# Patient Record
Sex: FEMALE | Race: WHITE | URBAN - METROPOLITAN AREA
[De-identification: names, ages, dates, MRNs, and addresses within clinical notes are randomized per-mention and may not be internally consistent; named-entity substitution may affect disease eponyms.]

---

## 2021-09-18 ENCOUNTER — EMERGENCY (EMERGENCY)
Facility: HOSPITAL | Age: 71
LOS: 0 days | Discharge: HOME | End: 2021-09-18
Attending: STUDENT IN AN ORGANIZED HEALTH CARE EDUCATION/TRAINING PROGRAM
Payer: MEDICARE

## 2021-09-18 VITALS
HEART RATE: 95 BPM | TEMPERATURE: 99 F | SYSTOLIC BLOOD PRESSURE: 130 MMHG | RESPIRATION RATE: 18 BRPM | OXYGEN SATURATION: 95 % | DIASTOLIC BLOOD PRESSURE: 81 MMHG

## 2021-09-18 VITALS
RESPIRATION RATE: 24 BRPM | OXYGEN SATURATION: 99 % | SYSTOLIC BLOOD PRESSURE: 120 MMHG | HEART RATE: 72 BPM | DIASTOLIC BLOOD PRESSURE: 63 MMHG | TEMPERATURE: 98 F | WEIGHT: 171.96 LBS | HEIGHT: 62 IN

## 2021-09-18 DIAGNOSIS — M25.561 PAIN IN RIGHT KNEE: ICD-10-CM

## 2021-09-18 DIAGNOSIS — N39.0 URINARY TRACT INFECTION, SITE NOT SPECIFIED: ICD-10-CM
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
## 2021-09-18 DIAGNOSIS — M25.562 PAIN IN LEFT KNEE: ICD-10-CM

## 2021-09-18 DIAGNOSIS — S00.83XA CONTUSION OF OTHER PART OF HEAD, INITIAL ENCOUNTER: ICD-10-CM

## 2021-09-18 DIAGNOSIS — M25.551 PAIN IN RIGHT HIP: ICD-10-CM
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
## 2021-09-18 LAB
ALBUMIN SERPL ELPH-MCNC: 3.6 G/DL — SIGNIFICANT CHANGE UP (ref 3.3–5)
ALP SERPL-CCNC: 75 U/L — SIGNIFICANT CHANGE UP (ref 40–120)
ALT FLD-CCNC: 26 U/L — SIGNIFICANT CHANGE UP (ref 12–78)
ANION GAP SERPL CALC-SCNC: 5 MMOL/L — SIGNIFICANT CHANGE UP (ref 5–17)
APPEARANCE UR: ABNORMAL
APTT BLD: 33.5 SEC — SIGNIFICANT CHANGE UP (ref 27.5–35.5)
AST SERPL-CCNC: 31 U/L — SIGNIFICANT CHANGE UP (ref 15–37)
BASOPHILS # BLD AUTO: 0.03 K/UL — SIGNIFICANT CHANGE UP (ref 0–0.2)
BASOPHILS NFR BLD AUTO: 0.4 % — SIGNIFICANT CHANGE UP (ref 0–2)
BILIRUB SERPL-MCNC: 0.4 MG/DL — SIGNIFICANT CHANGE UP (ref 0.2–1.2)
BILIRUB UR-MCNC: NEGATIVE — SIGNIFICANT CHANGE UP
BUN SERPL-MCNC: 11 MG/DL — SIGNIFICANT CHANGE UP (ref 7–23)
CALCIUM SERPL-MCNC: 9.2 MG/DL — SIGNIFICANT CHANGE UP (ref 8.5–10.1)
CHLORIDE SERPL-SCNC: 108 MMOL/L — SIGNIFICANT CHANGE UP (ref 96–108)
CK SERPL-CCNC: 118 U/L — SIGNIFICANT CHANGE UP (ref 26–192)
CO2 SERPL-SCNC: 29 MMOL/L — SIGNIFICANT CHANGE UP (ref 22–31)
COLOR SPEC: YELLOW — SIGNIFICANT CHANGE UP
CREAT SERPL-MCNC: 0.97 MG/DL — SIGNIFICANT CHANGE UP (ref 0.5–1.3)
DIFF PNL FLD: ABNORMAL
EOSINOPHIL # BLD AUTO: 0.07 K/UL — SIGNIFICANT CHANGE UP (ref 0–0.5)
EOSINOPHIL NFR BLD AUTO: 0.9 % — SIGNIFICANT CHANGE UP (ref 0–6)
GLUCOSE SERPL-MCNC: 88 MG/DL — SIGNIFICANT CHANGE UP (ref 70–99)
GLUCOSE UR QL: NEGATIVE MG/DL — SIGNIFICANT CHANGE UP
HCT VFR BLD CALC: 37 % — SIGNIFICANT CHANGE UP (ref 34.5–45)
HGB BLD-MCNC: 11.9 G/DL — SIGNIFICANT CHANGE UP (ref 11.5–15.5)
IMM GRANULOCYTES NFR BLD AUTO: 0.2 % — SIGNIFICANT CHANGE UP (ref 0–1.5)
INR BLD: 1.04 RATIO — SIGNIFICANT CHANGE UP (ref 0.88–1.16)
KETONES UR-MCNC: ABNORMAL
LEUKOCYTE ESTERASE UR-ACNC: ABNORMAL
LYMPHOCYTES # BLD AUTO: 1.27 K/UL — SIGNIFICANT CHANGE UP (ref 1–3.3)
LYMPHOCYTES # BLD AUTO: 15.8 % — SIGNIFICANT CHANGE UP (ref 13–44)
MCHC RBC-ENTMCNC: 30.6 PG — SIGNIFICANT CHANGE UP (ref 27–34)
MCHC RBC-ENTMCNC: 32.2 GM/DL — SIGNIFICANT CHANGE UP (ref 32–36)
MCV RBC AUTO: 95.1 FL — SIGNIFICANT CHANGE UP (ref 80–100)
MONOCYTES # BLD AUTO: 0.63 K/UL — SIGNIFICANT CHANGE UP (ref 0–0.9)
MONOCYTES NFR BLD AUTO: 7.8 % — SIGNIFICANT CHANGE UP (ref 2–14)
NEUTROPHILS # BLD AUTO: 6.03 K/UL — SIGNIFICANT CHANGE UP (ref 1.8–7.4)
NEUTROPHILS NFR BLD AUTO: 74.9 % — SIGNIFICANT CHANGE UP (ref 43–77)
NITRITE UR-MCNC: POSITIVE
PH UR: 7 — SIGNIFICANT CHANGE UP (ref 5–8)
PLATELET # BLD AUTO: 178 K/UL — SIGNIFICANT CHANGE UP (ref 150–400)
POTASSIUM SERPL-MCNC: 4.1 MMOL/L — SIGNIFICANT CHANGE UP (ref 3.5–5.3)
POTASSIUM SERPL-SCNC: 4.1 MMOL/L — SIGNIFICANT CHANGE UP (ref 3.5–5.3)
PROT SERPL-MCNC: 7.5 GM/DL — SIGNIFICANT CHANGE UP (ref 6–8.3)
PROT UR-MCNC: 15 MG/DL
PROTHROM AB SERPL-ACNC: 12.1 SEC — SIGNIFICANT CHANGE UP (ref 10.6–13.6)
RBC # BLD: 3.89 M/UL — SIGNIFICANT CHANGE UP (ref 3.8–5.2)
RBC # FLD: 12.1 % — SIGNIFICANT CHANGE UP (ref 10.3–14.5)
SODIUM SERPL-SCNC: 142 MMOL/L — SIGNIFICANT CHANGE UP (ref 135–145)
SP GR SPEC: 1.01 — SIGNIFICANT CHANGE UP (ref 1.01–1.02)
UROBILINOGEN FLD QL: NEGATIVE MG/DL — SIGNIFICANT CHANGE UP
WBC # BLD: 8.05 K/UL — SIGNIFICANT CHANGE UP (ref 3.8–10.5)
WBC # FLD AUTO: 8.05 K/UL — SIGNIFICANT CHANGE UP (ref 3.8–10.5)

## 2021-09-18 PROCEDURE — 73552 X-RAY EXAM OF FEMUR 2/>: CPT | Mod: 26,RT

## 2021-09-18 PROCEDURE — 85730 THROMBOPLASTIN TIME PARTIAL: CPT

## 2021-09-18 PROCEDURE — 80053 COMPREHEN METABOLIC PANEL: CPT

## 2021-09-18 PROCEDURE — 87086 URINE CULTURE/COLONY COUNT: CPT

## 2021-09-18 PROCEDURE — 81001 URINALYSIS AUTO W/SCOPE: CPT

## 2021-09-18 PROCEDURE — 82550 ASSAY OF CK (CPK): CPT

## 2021-09-18 PROCEDURE — 73502 X-RAY EXAM HIP UNI 2-3 VIEWS: CPT | Mod: 26,RT

## 2021-09-18 PROCEDURE — 73552 X-RAY EXAM OF FEMUR 2/>: CPT | Mod: RT

## 2021-09-18 PROCEDURE — 71250 CT THORAX DX C-: CPT

## 2021-09-18 PROCEDURE — 73562 X-RAY EXAM OF KNEE 3: CPT | Mod: 50

## 2021-09-18 PROCEDURE — 36415 COLL VENOUS BLD VENIPUNCTURE: CPT

## 2021-09-18 PROCEDURE — 72125 CT NECK SPINE W/O DYE: CPT | Mod: 26,MG

## 2021-09-18 PROCEDURE — 86850 RBC ANTIBODY SCREEN: CPT

## 2021-09-18 PROCEDURE — 99284 EMERGENCY DEPT VISIT MOD MDM: CPT | Mod: 25

## 2021-09-18 PROCEDURE — G1004: CPT

## 2021-09-18 PROCEDURE — 99285 EMERGENCY DEPT VISIT HI MDM: CPT

## 2021-09-18 PROCEDURE — 72125 CT NECK SPINE W/O DYE: CPT

## 2021-09-18 PROCEDURE — 86900 BLOOD TYPING SEROLOGIC ABO: CPT

## 2021-09-18 PROCEDURE — 87186 SC STD MICRODIL/AGAR DIL: CPT

## 2021-09-18 PROCEDURE — 71250 CT THORAX DX C-: CPT | Mod: 26,MG

## 2021-09-18 PROCEDURE — 93005 ELECTROCARDIOGRAM TRACING: CPT

## 2021-09-18 PROCEDURE — 73502 X-RAY EXAM HIP UNI 2-3 VIEWS: CPT | Mod: RT

## 2021-09-18 PROCEDURE — 74176 CT ABD & PELVIS W/O CONTRAST: CPT

## 2021-09-18 PROCEDURE — 70450 CT HEAD/BRAIN W/O DYE: CPT

## 2021-09-18 PROCEDURE — 85025 COMPLETE CBC W/AUTO DIFF WBC: CPT

## 2021-09-18 PROCEDURE — 73562 X-RAY EXAM OF KNEE 3: CPT | Mod: 26,50

## 2021-09-18 PROCEDURE — 70450 CT HEAD/BRAIN W/O DYE: CPT | Mod: 26,MG

## 2021-09-18 PROCEDURE — 85610 PROTHROMBIN TIME: CPT

## 2021-09-18 PROCEDURE — 86901 BLOOD TYPING SEROLOGIC RH(D): CPT

## 2021-09-18 PROCEDURE — 93010 ELECTROCARDIOGRAM REPORT: CPT

## 2021-09-18 PROCEDURE — 74176 CT ABD & PELVIS W/O CONTRAST: CPT | Mod: 26,MG

## 2021-09-18 RX ORDER — ACETAMINOPHEN 500 MG
1000 TABLET ORAL ONCE
Refills: 0 | Status: COMPLETED | OUTPATIENT
Start: 2021-09-18 | End: 2021-09-18

## 2021-09-18 RX ORDER — CEPHALEXIN 500 MG
500 CAPSULE ORAL ONCE
Refills: 0 | Status: COMPLETED | OUTPATIENT
Start: 2021-09-18 | End: 2021-09-18

## 2021-09-18 RX ORDER — CEPHALEXIN 500 MG
1 CAPSULE ORAL
Qty: 14 | Refills: 0
Start: 2021-09-18 | End: 2021-09-24

## 2021-09-18 RX ORDER — LISINOPRIL 2.5 MG/1
20 TABLET ORAL ONCE
Refills: 0 | Status: DISCONTINUED | OUTPATIENT
Start: 2021-09-18 | End: 2021-09-18

## 2021-09-18 RX ORDER — QUETIAPINE FUMARATE 200 MG/1
25 TABLET, FILM COATED ORAL ONCE
Refills: 0 | Status: DISCONTINUED | OUTPATIENT
Start: 2021-09-18 | End: 2021-09-18

## 2021-09-18 RX ADMIN — Medication 500 MILLIGRAM(S): at 16:46

## 2021-09-18 RX ADMIN — Medication 1000 MILLIGRAM(S): at 17:58

## 2021-09-18 NOTE — ED ADULT NURSE REASSESSMENT NOTE - NS ED NURSE REASSESS COMMENT FT1
Pt given dinner tray, awaiting transport back to facility, safety maintained, will continue to monitor. Pt given dinner tray, colostomy bag emptied, awaiting transport back to facility, safety maintained, will continue to monitor.

## 2021-09-18 NOTE — ED PROVIDER NOTE - NSFOLLOWUPINSTRUCTIONS_ED_ALL_ED_FT
FOLLOW UP WITH PMD  WITHIN 1-2DAYS, CALL TO MAKE APPOINTMENT  COME BACK TO ED IF YOUR CONDITION WORSENS OR IF YOU DEVELOP FEVER GREATER THAN 100.4F, CHEST PAIN,  SHORTNESS OF BREATH OR ANY OTHER SYMPTOMS CONCERNING TO YOU  TAKE TYLENOL (ACETAMINOPHEN) 650 MG EVERY 6 HOURS AS NEEDED FOR PAIN    IF YOU WERE PRESRCIBED ANY MEDICATIONS FROM TODAY'S VISIT, TAKE THEM AS DIRECTED (keflex 500mg twice a day for 7days)

## 2021-09-18 NOTE — ED PROVIDER NOTE - ATTENDING CONTRIBUTION TO CARE
I, Sindy Sanders DO,  performed the initial face to face bedside interview with this patient regarding history of present illness, review of symptoms and relevant past medical, social and family history.  I completed an independent physical examination.  I was the initial provider who evaluated this patient. I have signed out the follow up of any pending tests (i.e. labs, radiological studies) to the resident.  I have communicated the patient’s plan of care and disposition with the resident.  The history, relevant review of systems, past medical and surgical history, medical decision making, and physical examination was documented by the scribe in my presence and I attest to the accuracy of the documentation.

## 2021-09-18 NOTE — ED PROVIDER NOTE - FALL CAUSE
slipping, stumbling. tripping sensation intact/alert and oriented x 3/responds to pain/responds to verbal commands/deep reflexes intact/cranial nerves intact

## 2021-09-18 NOTE — ED ADULT TRIAGE NOTE - CHIEF COMPLAINT QUOTE
Pt presents to er with complaints of unwitnessed fall while ambulating with walker, states she struck her head/right hip and bilateral knees, pt denies use of blood thinners, unknown loc at this time.

## 2021-09-18 NOTE — ED PROVIDER NOTE - OBJECTIVE STATEMENT
70 y/o female presents to the ED brought in by EMS from [place] s/p unwitnessed fall. Pt notes that she recently had balance issues and just fell. Pt was on the floor for an hour before being brought to the ED. Pt states she hit her head, right hip, b/l knees. Pt now has hematoma to the right forehead. Denies LOC. States she has an ostomy in place. No other injuries or complaints. Not on any blood thinners.

## 2021-09-18 NOTE — ED PROVIDER NOTE - PATIENT PORTAL LINK FT
You can access the FollowMyHealth Patient Portal offered by [facility] by registering at the following website: [email or web address]. By joining [employer]’s FollowMyHealth portal, you will also be able to view your health information using other applications (apps) compatible with our system.

## 2021-09-18 NOTE — ED PROVIDER NOTE - CPE EDP ENMT NORM
Has a cold sore on his lip--would like medication sent to his pharmacy also wants to make sure that his last office visit was coded as a [facility].      Opal Coto normal...

## 2021-09-18 NOTE — ED PROVIDER NOTE - PROGRESS NOTE DETAILS
diana: has uti, will arrange ambulance to [facility] Quintero: pt endorsed to me from prior physician PENDING: UA & ambulation trial. LIKELY DISPOSITION: d/c back to [facility] for mechanical fall w/o LOC Tommy DO: patient able to ambulate; imaging neg for fx; will treat uti; return to [facility]; strict return precautions given.

## 2021-09-18 NOTE — ED PROVIDER NOTE - MUSCULOSKELETAL [+], MLM
+right hip pain, +b/l knee pain Benzoyl Peroxide Counseling: Patient counseled that medicine may cause skin irritation and bleach clothing.  In the event of skin irritation, the patient was advised to reduce the amount of the drug applied or use it less frequently.   The patient verbalized understanding of the proper use and possible adverse effects of benzoyl peroxide.  All of the patient's questions and concerns were addressed.

## 2022-10-28 ENCOUNTER — EMERGENCY (EMERGENCY)
Facility: HOSPITAL | Age: 72
LOS: 0 days | Discharge: HOME | End: 2022-10-29
Attending: EMERGENCY MEDICINE
Payer: MEDICAID

## 2022-10-28 VITALS
HEART RATE: 69 BPM | WEIGHT: 149.91 LBS | DIASTOLIC BLOOD PRESSURE: 101 MMHG | HEIGHT: 62 IN | RESPIRATION RATE: 18 BRPM | SYSTOLIC BLOOD PRESSURE: 158 MMHG | TEMPERATURE: 99 F | OXYGEN SATURATION: 100 %

## 2022-10-28 DIAGNOSIS — S01.01XA LACERATION WITHOUT FOREIGN BODY OF SCALP, INITIAL ENCOUNTER: ICD-10-CM

## 2022-10-28 DIAGNOSIS — S09.90XA UNSPECIFIED INJURY OF HEAD, INITIAL ENCOUNTER: ICD-10-CM
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
## 2022-10-28 DIAGNOSIS — R51.9 HEADACHE, UNSPECIFIED: ICD-10-CM
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
## 2022-10-28 DIAGNOSIS — Z23 ENCOUNTER FOR IMMUNIZATION: ICD-10-CM

## 2022-10-28 PROCEDURE — G1004: CPT

## 2022-10-28 PROCEDURE — 12002 RPR S/N/AX/GEN/TRNK2.6-7.5CM: CPT

## 2022-10-28 PROCEDURE — 70450 CT HEAD/BRAIN W/O DYE: CPT | Mod: 26,MG

## 2022-10-28 PROCEDURE — 72125 CT NECK SPINE W/O DYE: CPT | Mod: MG

## 2022-10-28 PROCEDURE — 70450 CT HEAD/BRAIN W/O DYE: CPT | Mod: MG

## 2022-10-28 PROCEDURE — 90715 TDAP VACCINE 7 YRS/> IM: CPT

## 2022-10-28 PROCEDURE — 72125 CT NECK SPINE W/O DYE: CPT | Mod: 26,MG

## 2022-10-28 PROCEDURE — 90471 IMMUNIZATION ADMIN: CPT

## 2022-10-28 PROCEDURE — 99285 EMERGENCY DEPT VISIT HI MDM: CPT | Mod: 25

## 2022-10-28 PROCEDURE — 99284 EMERGENCY DEPT VISIT MOD MDM: CPT | Mod: 25

## 2022-10-28 RX ORDER — POTASSIUM CHLORIDE 20 MEQ
15 PACKET (EA) ORAL
Qty: 0 | Refills: 0 | DISCHARGE

## 2022-10-28 RX ORDER — TRAZODONE HCL 50 MG
1 TABLET ORAL
Qty: 0 | Refills: 0 | DISCHARGE

## 2022-10-28 RX ORDER — TETANUS TOXOID, REDUCED DIPHTHERIA TOXOID AND ACELLULAR PERTUSSIS VACCINE, ADSORBED 5; 2.5; 8; 8; 2.5 [IU]/.5ML; [IU]/.5ML; UG/.5ML; UG/.5ML; UG/.5ML
0.5 SUSPENSION INTRAMUSCULAR ONCE
Refills: 0 | Status: COMPLETED | OUTPATIENT
Start: 2022-10-28 | End: 2022-10-28

## 2022-10-28 RX ORDER — PSYLLIUM SEED (WITH DEXTROSE)
2 POWDER (GRAM) ORAL
Qty: 0 | Refills: 0 | DISCHARGE

## 2022-10-28 RX ORDER — SIMETHICONE 80 MG/1
1 TABLET, CHEWABLE ORAL
Qty: 0 | Refills: 0 | DISCHARGE

## 2022-10-28 RX ORDER — ESCITALOPRAM OXALATE 10 MG/1
2 TABLET, FILM COATED ORAL
Qty: 0 | Refills: 0 | DISCHARGE

## 2022-10-28 RX ORDER — OLANZAPINE 15 MG/1
1 TABLET, FILM COATED ORAL
Qty: 0 | Refills: 0 | DISCHARGE

## 2022-10-28 RX ORDER — OLOPATADINE HYDROCHLORIDE 1 MG/ML
1 SOLUTION/ DROPS OPHTHALMIC
Qty: 0 | Refills: 0 | DISCHARGE

## 2022-10-28 RX ORDER — ACETAMINOPHEN 500 MG
2 TABLET ORAL
Qty: 0 | Refills: 0 | DISCHARGE

## 2022-10-28 RX ORDER — LOPERAMIDE HCL 2 MG
1 TABLET ORAL
Qty: 0 | Refills: 0 | DISCHARGE

## 2022-10-28 RX ADMIN — TETANUS TOXOID, REDUCED DIPHTHERIA TOXOID AND ACELLULAR PERTUSSIS VACCINE, ADSORBED 0.5 MILLILITER(S): 5; 2.5; 8; 8; 2.5 SUSPENSION INTRAMUSCULAR at 23:13

## 2022-10-28 NOTE — ED PROVIDER NOTE - PATIENT PORTAL LINK FT
You can access the FollowMyHealth Patient Portal offered by [facility] by registering at the following website: [email or web address]. By joining [employer]’s FollowMyHealth portal, you will also be able to view your health information using other applications (apps) compatible with our system.

## 2022-10-28 NOTE — PHARMACOTHERAPY INTERVENTION NOTE - COMMENTS
Medication reconciliation completed.  Reviewed Medication list and confirmed med allergies with list from Care Facility "[facility]"; confirmed with Dr. First Medisaak.

## 2022-10-28 NOTE — ED PROVIDER NOTE - NSFOLLOWUPINSTRUCTIONS_ED_ALL_ED_FT
Laceration    WHAT YOU NEED TO KNOW:    A laceration is an injury to the skin and the soft tissue underneath it. Lacerations happen when you are cut or hit by something. They can happen anywhere on the body.     DISCHARGE INSTRUCTIONS:    Return to the emergency department if:     You have heavy bleeding or bleeding that does not stop after 10 minutes of holding firm, direct pressure over the wound.       Your wound opens up.     Contact your healthcare provider if:     You have a fever or chills.       Your laceration is red, warm, or swollen.      You have red streaks on your skin coming from your wound.      You have white or yellow drainage from the wound that smells bad.      You have pain that gets worse, even after treatment.       You have questions or concerns about your condition or care.     Medicines:     Take your medicine as directed. Contact your healthcare provider if you think your medicine is not helping or if you have side effects. Tell him or her if you are allergic to any medicine. Keep a list of the medicines, vitamins, and herbs you take. Include the amounts, and when and why you take them. Bring the list or the pill bottles to follow-up visits. Carry your medicine list with you in case of an emergency.    Care for your wound as directed:     Do not get your wound wet until your healthcare provider says it is okay. Do not soak your wound in water. Do not go swimming until your healthcare provider says it is okay. Carefully wash the wound with soap and water. Gently pat the area dry or allow it to air dry.       Change your bandages when they get wet, dirty, or after washing. Apply new, clean bandages as directed. Do not apply elastic bandages or tape too tight. Do not put powders or lotions over your incision.       Apply antibiotic ointment as directed. Your healthcare provider may give you antibiotic ointment to put over your wound if you have stitches. If you have strips of tape over your incision, let them dry up and fall off on their own. If they do not fall off within 14 days, gently remove them. If you have glue over your wound, do not remove or pick at it. If your glue comes off, do not replace it with glue that you have at home.       Check your wound every day for signs of infection such as swelling, redness, or pus.     Self-care:     Apply ice on your wound for 15 to 20 minutes every hour or as directed. Use an ice pack, or put crushed ice in a plastic bag. Cover it with a towel. Ice helps prevent tissue damage and decreases swelling and pain.      Decrease scarring of your wound by applying ointments as directed. Do not apply ointments until your healthcare provider says it is okay. You may need to wait until your wound is healed. Ask which ointment to buy and how often to use it. After your wound is healed, use sunscreen over the area when you are out in the sun. You should do this for at least 6 months to 1 year after your injury.     Follow up with your healthcare provider as directed: You may need to follow up in 24 to 48 hours to have your wound checked for infection. You will need to return in 10 days if you have stitches or staples so they can be removed. Care for your wound as directed to prevent infection and help it heal. Write down your questions so you remember to ask them during your visits.

## 2022-10-28 NOTE — ED PROVIDER NOTE - CLINICAL SUMMARY MEDICAL DECISION MAKING FREE TEXT BOX
73 yo female presents with head injury and laceration. Lac repaired and will d.c back to [facility]. -Gualberto Montejo PA-C

## 2022-10-28 NOTE — ED ADULT TRIAGE NOTE - CHIEF COMPLAINT QUOTE
Witnessed trip and fall with walker. No LOC. Hit head, laceration to back of head. Bleeding controlled with dressing PTA. No anticoagulants. MD Meyer made aware, neuro alert called. HTN noted at triage.

## 2022-10-28 NOTE — ED PROVIDER NOTE - NS ED ATTENDING STATEMENT MOD
This was a shared visit with the MANDY. I reviewed and verified the documentation and independently performed the documented:

## 2022-10-28 NOTE — ED PROVIDER NOTE - OBJECTIVE STATEMENT
73 yo female with a PSH of colostomy placement not on AC was BIBA from [facility] for a fall and head laceration. Pt states she slipped and hit the bed of her roommate and then the floor.

## 2022-10-28 NOTE — ED PROVIDER NOTE - ATTENDING APP SHARED VISIT CONTRIBUTION OF CARE
I, Adán Meyer, performed the initial face to face bedside interview with this patient regarding history of present illness, review of symptoms and relevant past medical, social and family history.  I completed an independent physical examination.  I was the initial provider who evaluated this patient. I have signed out the follow up of any pending tests (i.e. labs, radiological studies) to the MANDY.  I have communicated the patient’s plan of care and disposition with the MANDY.      pt s/p slip and fall at NH with head lac.   no LOC.   NA activated.   skin flap noted to back of head.   CT, suture, ADA and reeval

## 2022-10-29 VITALS
SYSTOLIC BLOOD PRESSURE: 132 MMHG | TEMPERATURE: 98 F | HEART RATE: 69 BPM | OXYGEN SATURATION: 98 % | DIASTOLIC BLOOD PRESSURE: 81 MMHG | RESPIRATION RATE: 18 BRPM

## 2022-10-29 PROBLEM — Z78.9 OTHER SPECIFIED HEALTH STATUS: Chronic | Status: ACTIVE | Noted: 2021-09-19

## 2022-10-29 NOTE — ED ADULT NURSE NOTE - OBJECTIVE STATEMENT
As per EMS, pt has trouble walking x couple of weeks worsening past 4 days. Denies fall or injury.  As per EMS, pt's  c/o intermittent episodes of confusion. Pt awake alert and oriented x4 in Triage. Denies fever/chills. hx fibromyalgia.

## 2022-10-29 NOTE — ED ADULT NURSE REASSESSMENT NOTE - NS ED NURSE REASSESS COMMENT FT1
assume care from ryan hernandez. pt resting comfortably, vss no s&s of distress awaiting for transport back to facility. lila

## 2022-10-29 NOTE — ED ADULT NURSE REASSESSMENT NOTE - NS ED NURSE REASSESS COMMENT FT1
Report received from WOODY Burciaga. Patient resting in stretcher asking for a cup of coffee and gram crackers. Patient awaiting transport back to the facility. No complaints of pain or discomfort at this time. Safety maintained.

## 2022-11-10 ENCOUNTER — OFFICE (OUTPATIENT)
Dept: URBAN - METROPOLITAN AREA CLINIC 102 | Facility: CLINIC | Age: 72
Setting detail: OPHTHALMOLOGY
End: 2022-11-10
Payer: MEDICARE

## 2022-11-10 DIAGNOSIS — H26.491: ICD-10-CM

## 2022-11-10 PROCEDURE — 99024 POSTOP FOLLOW-UP VISIT: CPT | Performed by: OPHTHALMOLOGY

## 2022-11-10 ASSESSMENT — CONFRONTATIONAL VISUAL FIELD TEST (CVF)
OD_FINDINGS: FULL
OS_FINDINGS: FULL

## 2022-11-10 ASSESSMENT — SPHEQUIV_DERIVED
OD_SPHEQUIV: -1.875
OS_SPHEQUIV: -1.5
OS_SPHEQUIV: 0.125
OD_SPHEQUIV: -0.25

## 2022-11-10 ASSESSMENT — REFRACTION_MANIFEST
OD_SPHERE: -0.25
OD_CYLINDER: SPHERE
OD_AXIS: 096
OD_SPHERE: -1.00
OS_AXIS: 120
OS_SPHERE: +0.25
OD_VA1: 20/70
OS_CYLINDER: -0.25
OS_VA1: 20/20
OD_ADD: +2.00
OS_VA1: 20/250
OS_SPHERE: -3.00
OS_ADD: +2.00
OD_VA1: 20/150
OD_CYLINDER: -1.75
OS_CYLINDER: SPH

## 2022-11-10 ASSESSMENT — TONOMETRY
OD_IOP_MMHG: 18
OS_IOP_MMHG: 14

## 2022-11-10 ASSESSMENT — KERATOMETRY
OS_K2POWER_DIOPTERS: 44.00
OS_AXISANGLE_DEGREES: 164
OD_AXISANGLE_DEGREES: 143
OS_K1POWER_DIOPTERS: 43.00
METHOD_AUTO_MANUAL: AUTO
OD_K2POWER_DIOPTERS: 42.75
OD_K1POWER_DIOPTERS: 42.25

## 2022-11-10 ASSESSMENT — REFRACTION_AUTOREFRACTION
OS_SPHERE: -0.50
OS_AXIS: 77
OS_CYLINDER: -2.00
OD_AXIS: 73
OD_SPHERE: 0.00
OD_CYLINDER: -0.50

## 2022-11-10 ASSESSMENT — AXIALLENGTH_DERIVED
OD_AL: 24.066
OD_AL: 24.7433
OS_AL: 24.1912
OS_AL: 23.5433

## 2022-11-10 ASSESSMENT — VISUAL ACUITY
OS_BCVA: 20/25
OD_BCVA: 20/40-1

## 2024-01-11 ENCOUNTER — EMERGENCY (EMERGENCY)
Facility: HOSPITAL | Age: 74
LOS: 0 days | Discharge: HOME | End: 2024-01-11
Attending: EMERGENCY MEDICINE
Payer: MEDICARE

## 2024-01-11 VITALS
WEIGHT: 149.91 LBS | RESPIRATION RATE: 18 BRPM | HEIGHT: 62 IN | DIASTOLIC BLOOD PRESSURE: 78 MMHG | OXYGEN SATURATION: 96 % | TEMPERATURE: 99 F | SYSTOLIC BLOOD PRESSURE: 128 MMHG | HEART RATE: 56 BPM

## 2024-01-11 VITALS
TEMPERATURE: 98 F | HEART RATE: 61 BPM | RESPIRATION RATE: 18 BRPM | SYSTOLIC BLOOD PRESSURE: 154 MMHG | DIASTOLIC BLOOD PRESSURE: 96 MMHG | OXYGEN SATURATION: 96 %

## 2024-01-11 DIAGNOSIS — Z43.3 ENCOUNTER FOR ATTENTION TO COLOSTOMY: ICD-10-CM

## 2024-01-11 PROCEDURE — 99283 EMERGENCY DEPT VISIT LOW MDM: CPT

## 2024-01-11 PROCEDURE — 99282 EMERGENCY DEPT VISIT SF MDM: CPT

## 2024-01-11 NOTE — ED PROVIDER NOTE - PATIENT PORTAL LINK FT
You can access the FollowMyHealth Patient Portal offered by [facility] by registering at the following website: [email or web address]. By joining [employer]’s FollowMyHealth portal, you will also be able to view your health information using other applications (apps) compatible with our system. You can access the FollowMyHealth Patient Portal offered by [facility] by registering at the following website: [email or web address]. By joining [employer]’s FollowMyHealth portal, you will also be able to view your health information using other applications (apps) compatible with our system.

## 2024-01-11 NOTE — ED ADULT NURSE NOTE - NSFALLHARMRISKINTERV_ED_ALL_ED
Assistance OOB with selected safe patient handling equipment if applicable/Assistance with ambulation/Communicate risk of Fall with Harm to all staff, patient, and family/Monitor gait and stability/Provide patient with walking aids/Provide visual cue: red socks, yellow wristband, yellow gown, etc/Reinforce activity limits and safety measures with patient and family/Bed in lowest position, wheels locked, appropriate side rails in place/Call bell, personal items and telephone in reach/Instruct patient to call for assistance before getting out of bed/chair/stretcher/Non-slip footwear applied when patient is off stretcher/[place] to call system/Physically safe environment - no spills, clutter or unnecessary equipment/Purposeful Proactive Rounding/Room/bathroom lighting operational, light cord in reach Assistance OOB with selected safe patient handling equipment if applicable/Assistance with ambulation/Communicate risk of Fall with Harm to all staff, patient, and family/Monitor gait and stability/Provide patient with walking aids/Provide visual cue: red socks, yellow wristband, yellow gown, etc/Reinforce activity limits and safety measures with patient and family/Bed in lowest position, wheels locked, appropriate side rails in place/Call bell, personal items and telephone in reach/Instruct patient to call for assistance before getting out of bed/chair/stretcher/Non-slip footwear applied when patient is off stretcher/[place] to call system/Physically safe environment - no spills, clutter or unnecessary equipment/Purposeful Proactive Rounding/Room/bathroom lighting operational, light cord in reach

## 2024-01-11 NOTE — ED PROVIDER NOTE - PHYSICAL EXAMINATION
Gen:  Well appearing in NAD  Head:  NC/AT  HEENT: pupils perrl,no pharyngeal erythema, uvula midline  Cardiac: S1S2, RRR  Abd: Soft, non tender rupper quadrant and rlq ostomy bags draining fecelent material lower greater than upper no leakage noted  Resp: No distress, CTA   musculoskeletal:: no deformities, no swelling, strength +5/+5  Skin: warm and dry as visualized, no rashes  Neuro: TORRES, aao x 4  Psych:alert, cooperative, appropriate mood and affect for situation

## 2024-01-11 NOTE — ED PROVIDER NOTE - CLINICAL SUMMARY MEDICAL DECISION MAKING FREE TEXT BOX
Education was provided in regards to the followin )  Maintaining good blood pressure control and taking antihypertensive medications as prescribed  2 )  No strenuous activity involving lifting more than 50lbs  3 )  Awareness of the warning symptoms of aortic dissection such as chest pain, back pain, shortness of breath, lightheadedness or dizziness and to seek immediate medical attention at the nearest ER   4 )  The importance of continued surveillance with visits in the Aortic Disease clinic and obtaining CT Scans as recommended  5 ) Importance of avoiding tobacco products  ostomies with possible leakage, no leakage noted after 2 hours abd soft non tender will dc

## 2024-01-11 NOTE — ED PROVIDER NOTE - NSFOLLOWUPINSTRUCTIONS_ED_ALL_ED_FT
Colostomy Home Guide, Adult  A body outline showing the colon with a close-up of where it is attached to an opening, or stoma, in the abdomen.  A colostomy is a surgically created opening (ostomy) that brings a piece of the large intestine through an opening in the abdomen to create a stoma. The stoma allows stool (feces) and gas (flatus) to leave the body. A stoma may be a variety of shapes and sizes. An ostomy pouch or bag is used to cover the stoma and to contain the stool and gas. A colostomy may be temporary or permanent, depending on the medical reason for your surgery.    After surgery, you will need to learn to care for your ostomy. This includes emptying and changing the colostomy bag as needed. There are many different types of bags or pouching options, including one-piece, two-piece, clear, fabric covered, flat or curved (convex), drainable, and closed or vented options. Your health care provider will help you select the best pouch for you.    How to care for the stoma  Your stoma should look pink, red, and moist, like the inside of your cheek. It should not be painful to touch, but it may bleed easily when rubbed or bumped. Soon after surgery, the stoma may be swollen, but this goes away within 6 weeks. To care for the stoma:  Keep the skin around the stoma clean and dry.  Use a clean, soft washcloth to gently wash the stoma and the skin around it. Clean using a circular motion, and wipe away from the stoma opening, nottoward it.  Use warm water and only use cleansers, such as mild soap and stoma-safe adhesive remover, recommended by your health care provider.  Inspect and dry the skin around the stoma.  Use stoma powder or skin protectant on your skin as told by your health care provider. Do not use any other powders, gels, wipes, or creams on the skin around the stoma. These may keep pouch adhesive from sticking or may cause injury to your stoma.  Check the stoma area every day for signs of infection or problems. Check for:  New or worsening redness, warmth, swelling, irritation, itching, or pain around the stoma.  New or worsening changes to the stoma, such as bleeding, trauma, or a dark and dusky color.  Changes to the drainage from the stoma, such as pus, blood, a large amount of liquid drainage, or little to no stool drainage.  Measure the stoma opening regularly and record the size. Watch for changes, such as the stoma getting longer, becoming flat, or falling below skin level. (It is normal for the stoma to get smaller as the swelling goes away.) Share this information with your health care provider. Changes in the size or shape of the stoma may require a different style of pouch to be used.  How to empty the colostomy bag  Washing hands with soap and water.  A person's torso with a stoma in the abdomen and an attached ostomy bag.  Empty your bag at bedtime, before physical activity or sexual relations, and whenever it is one-third to one-half full. Do not let the bag get more than half-full with stool or gas. The bag could leak if it gets too full. Some colostomy bags have a built-in gas release valve that releases gas often throughout the day.    Follow these basic steps for emptying a drainable pouch:  Prepare the toilet or container:  If draining directly into the toilet, put several pieces of toilet paper into the toilet water. This will prevent splashing as you empty the stool into the toilet. Sit far back on the toilet seat.  If draining into a container, place a few pieces of moistened toilet paper in the bottom of the container. This can help when emptying the container.  Remove the clip or the hook-and-loop fastener from the tail end of the bag.  Unroll the tail, then empty the stool into the toilet or container.  Clean the tail with toilet paper or a moist towelette.  Reroll the tail, and close it with the clip or the hook-and-loop fastener.  Wash your hands.  How to change the colostomy bag  Change your bag every 3–4 days or as often as told by your health care provider. Also change the bag if it is leaking or  from the skin, or if your skin around the stoma looks or feels irritated. Irritated skin may be a sign that the bag is leaking.    Always have colostomy supplies with you, and follow these basic steps:  Have paper towels or tissues and a trash bag nearby to clean any discharge and dispose of the soiled pouch.  Remove the old pouching appliance. Use your fingers, a warm, moist cloth, or a stoma-safe adhesive remover to gently remove the pouch and remove adhesive residue.  Clean the stoma area with water or with mild soap and water, as directed. Use water to rinse away any soap.  Dry the skin. You may use the cool setting on a hair dryer to do this.  Use a tracing pattern (template) to cut the skin barrier to the size needed. The opening should be large enough to fit around the stoma, but you should not see exposed skin.  If you are using a two-piece bag, attach the bag and the base to each other. Add the barrier ring, if you use one.  If directed, apply stoma powder or skin protectant to the skin.  Warm the pouch base with your hands or blow with a hair dryer for 5–10 seconds.  Remove the paper from the adhesive backing of the pouch base.  Press the adhesive backing onto the skin around the stoma.  Gently rub the pouch base onto the skin. This creates heat that helps the barrier to stick.  Apply barrier strips to the edges of the pouch, if desired.  Dispose of the soiled pouch, and wash your hands.  General recommendations  Avoid wearing tight clothes or having anything press directly on your stoma or bag. Change your clothing whenever it is soiled or damp.  You may shower or bathe with the bag on or off. Do not use harsh or oily soaps or lotions. Dry the skin and bag after bathing. Do not shower or bathe right after changing the pouch. Wait 4–5 hours.  Store all supplies in a cool, dry place. Do not leave supplies in extreme heat because some parts can melt or not stick as well.  Whenever you leave home, take extra clothing and an extra ostomy pouch with you.  If your bag gets wet, you can dry it with a hair dryer on the cool setting.  To prevent odor, you may put drops of ostomy deodorizer in the bag.  If recommended by your health care provider, put ostomy lubricant inside the bag. This helps stool slide out of the bag more easily and completely.  Contact a health care provider if:  You have new or worsening redness, warmth, swelling, irritation, itching, or pain around the stoma.  You have new or worsening changes to the stoma, such as bleeding, trauma, or a dark and dusky color.  There are changes to the drainage from the stoma, such as pus, blood, a large amount of liquid drainage, or little to no stool drainage.  Your stoma extends in or out farther than normal.  You need to change your bag every day or have frequent leaks.  You have a fever.  Get help right away if:  Your stool is bloody.  You have nausea or you vomit.  You have trouble breathing.  These symptoms may be an emergency. Get help right away. Call 911.  Do not wait to see if the symptoms will go away.  Do not drive yourself to the hospital.  Summary  Measure your stoma opening regularly and record the size. Watch for changes.  Empty your bag at bedtime, before physical activity or sexual relations, and whenever it is one-third to one-half full. Do not let the bag get more than half-full with stool or gas.  Change your bag every 3–4 days or as often as told by your health care provider.  Whenever you leave home, take extra clothing and an extra ostomy pouch with you.  This information is not intended to replace advice given to you by your health care provider. Make sure you discuss any questions you have with your health care provider.    Document Revised: 12/28/2022 Document Reviewed: 12/28/2022  [employer] Patient Education © 2023 [employer]  [employer] logo  Terms and Conditions  Privacy Policy  Editorial Policy  All content on this site: Copyright © 2024 [employer], its licensors, and contributors. All rights are reserved, including those for text and data mining, AI training, and similar technologies. For all open access content, the Creative Commons licensing terms apply.  Cookies are used by this site. To decline or learn more, Colostomy Home Guide, Adult  A body outline showing the colon with a close-up of where it is attached to an opening, or stoma, in the abdomen.  A colostomy is a surgically created opening (ostomy) that brings a piece of the large intestine through an opening in the abdomen to create a stoma. The stoma allows stool (feces) and gas (flatus) to leave the body. A stoma may be a variety of shapes and sizes. An ostomy pouch or bag is used to cover the stoma and to contain the stool and gas. A colostomy may be temporary or permanent, depending on the medical reason for your surgery.    After surgery, you will need to learn to care for your ostomy. This includes emptying and changing the colostomy bag as needed. There are many different types of bags or pouching options, including one-piece, two-piece, clear, fabric covered, flat or curved (convex), drainable, and closed or vented options. Your health care provider will help you select the best pouch for you.    How to care for the stoma  Your stoma should look pink, red, and moist, like the inside of your cheek. It should not be painful to touch, but it may bleed easily when rubbed or bumped. Soon after surgery, the stoma may be swollen, but this goes away within 6 weeks. To care for the stoma:  Keep the skin around the stoma clean and dry.  Use a clean, soft washcloth to gently wash the stoma and the skin around it. Clean using a circular motion, and wipe away from the stoma opening, nottoward it.  Use warm water and only use cleansers, such as mild soap and stoma-safe adhesive remover, recommended by your health care provider.  Inspect and dry the skin around the stoma.  Use stoma powder or skin protectant on your skin as told by your health care provider. Do not use any other powders, gels, wipes, or creams on the skin around the stoma. These may keep pouch adhesive from sticking or may cause injury to your stoma.  Check the stoma area every day for signs of infection or problems. Check for:  New or worsening redness, warmth, swelling, irritation, itching, or pain around the stoma.  New or worsening changes to the stoma, such as bleeding, trauma, or a dark and dusky color.  Changes to the drainage from the stoma, such as pus, blood, a large amount of liquid drainage, or little to no stool drainage.  Measure the stoma opening regularly and record the size. Watch for changes, such as the stoma getting longer, becoming flat, or falling below skin level. (It is normal for the stoma to get smaller as the swelling goes away.) Share this information with your health care provider. Changes in the size or shape of the stoma may require a different style of pouch to be used.  How to empty the colostomy bag  Washing hands with soap and water.  A person's torso with a stoma in the abdomen and an attached ostomy bag.  Empty your bag at bedtime, before physical activity or sexual relations, and whenever it is one-third to one-half full. Do not let the bag get more than half-full with stool or gas. The bag could leak if it gets too full. Some colostomy bags have a built-in gas release valve that releases gas often throughout the day.    Follow these basic steps for emptying a drainable pouch:  Prepare the toilet or container:  If draining directly into the toilet, put several pieces of toilet paper into the toilet water. This will prevent splashing as you empty the stool into the toilet. Sit far back on the toilet seat.  If draining into a container, place a few pieces of moistened toilet paper in the bottom of the container. This can help when emptying the container.  Remove the clip or the hook-and-loop fastener from the tail end of the bag.  Unroll the tail, then empty the stool into the toilet or container.  Clean the tail with toilet paper or a moist towelette.  Reroll the tail, and close it with the clip or the hook-and-loop fastener.  Wash your hands.  How to change the colostomy bag  Change your bag every 3–4 days or as often as told by your health care provider. Also change the bag if it is leaking or  from the skin, or if your skin around the stoma looks or feels irritated. Irritated skin may be a sign that the bag is leaking.    Always have colostomy supplies with you, and follow these basic steps:  Have paper towels or tissues and a trash bag nearby to clean any discharge and dispose of the soiled pouch.  Remove the old pouching appliance. Use your fingers, a warm, moist cloth, or a stoma-safe adhesive remover to gently remove the pouch and remove adhesive residue.  Clean the stoma area with water or with mild soap and water, as directed. Use water to rinse away any soap.  Dry the skin. You may use the cool setting on a hair dryer to do this.  Use a tracing pattern (template) to cut the skin barrier to the size needed. The opening should be large enough to fit around the stoma, but you should not see exposed skin.  If you are using a two-piece bag, attach the bag and the base to each other. Add the barrier ring, if you use one.  If directed, apply stoma powder or skin protectant to the skin.  Warm the pouch base with your hands or blow with a hair dryer for 5–10 seconds.  Remove the paper from the adhesive backing of the pouch base.  Press the adhesive backing onto the skin around the stoma.  Gently rub the pouch base onto the skin. This creates heat that helps the barrier to stick.  Apply barrier strips to the edges of the pouch, if desired.  Dispose of the soiled pouch, and wash your hands.  General recommendations  Avoid wearing tight clothes or having anything press directly on your stoma or bag. Change your clothing whenever it is soiled or damp.  You may shower or bathe with the bag on or off. Do not use harsh or oily soaps or lotions. Dry the skin and bag after bathing. Do not shower or bathe right after changing the pouch. Wait 4–5 hours.  Store all supplies in a cool, dry place. Do not leave supplies in extreme heat because some parts can melt or not stick as well.  Whenever you leave home, take extra clothing and an extra ostomy pouch with you.  If your bag gets wet, you can dry it with a hair dryer on the cool setting.  To prevent odor, you may put drops of ostomy deodorizer in the bag.  If recommended by your health care provider, put ostomy lubricant inside the bag. This helps stool slide out of the bag more easily and completely.  Contact a health care provider if:  You have new or worsening redness, warmth, swelling, irritation, itching, or pain around the stoma.  You have new or worsening changes to the stoma, such as bleeding, trauma, or a dark and dusky color.  There are changes to the drainage from the stoma, such as pus, blood, a large amount of liquid drainage, or little to no stool drainage.  Your stoma extends in or out farther than normal.  You need to change your bag every day or have frequent leaks.  You have a fever.  Get help right away if:  Your stool is bloody.  You have nausea or you vomit.  You have trouble breathing.  These symptoms may be an emergency. Get help right away. Call 911.  Do not wait to see if the symptoms will go away.  Do not drive yourself to the hospital.  Summary  Measure your stoma opening regularly and record the size. Watch for changes.  Empty your bag at bedtime, before physical activity or sexual relations, and whenever it is one-third to one-half full. Do not let the bag get more than half-full with stool or gas.  Change your bag every 3–4 days or as often as told by your health care provider.  Whenever you leave home, take extra clothing and an extra ostomy pouch with you.  This information is not intended to replace advice given to you by your health care provider. Make sure you discuss any questions you have with your health care provider.    Document Revised: 12/28/2022 Document Reviewed: 12/28/2022  "[employer] Patient Education © 2023 "[employer] Inc.  "[employer] logo  Terms and Conditions  Privacy Policy  Editorial Policy  All content on this site: Copyright © 2024 "[employer] its licensors, and contributors. All rights are reserved, including those for text and data mining, AI training, and similar technologies. For all open access content, the Creative Commons licensing terms apply.  Cookies are used by this site. To decline or learn more,

## 2024-01-11 NOTE — ED ADULT NURSE NOTE - OBJECTIVE STATEMENT
[facility]. [facility] Current ostomy is working [facility]DL

## 2025-08-14 ENCOUNTER — OUTPATIENT (OUTPATIENT)
Dept: OUTPATIENT SERVICES | Facility: HOSPITAL | Age: 75
LOS: 1 days | Discharge: ANOTHER HOSPITAL | End: 2025-08-14
Payer: MEDICARE

## 2025-08-14 PROBLEM — Z93.3 COLOSTOMY STATUS: Chronic | Status: ACTIVE | Noted: 2024-01-11

## 2025-08-14 PROCEDURE — 90839 PSYTX CRISIS INITIAL 60 MIN: CPT

## 2025-08-15 DIAGNOSIS — F29 UNSPECIFIED PSYCHOSIS NOT DUE TO A SUBSTANCE OR KNOWN PHYSIOLOGICAL CONDITION: ICD-10-CM
